# Patient Record
Sex: MALE | Race: OTHER | URBAN - METROPOLITAN AREA
[De-identification: names, ages, dates, MRNs, and addresses within clinical notes are randomized per-mention and may not be internally consistent; named-entity substitution may affect disease eponyms.]

---

## 2017-08-01 ENCOUNTER — APPOINTMENT (OUTPATIENT)
Dept: GENERAL RADIOLOGY | Facility: HOSPITAL | Age: 27
End: 2017-08-01
Attending: PHYSICIAN ASSISTANT

## 2017-08-01 ENCOUNTER — HOSPITAL ENCOUNTER (EMERGENCY)
Facility: HOSPITAL | Age: 27
Discharge: HOME | End: 2017-08-01
Attending: PHYSICIAN ASSISTANT

## 2017-08-01 VITALS
BODY MASS INDEX: 27.78 KG/M2 | HEART RATE: 88 BPM | RESPIRATION RATE: 18 BRPM | TEMPERATURE: 99 F | DIASTOLIC BLOOD PRESSURE: 83 MMHG | SYSTOLIC BLOOD PRESSURE: 127 MMHG | WEIGHT: 177 LBS | OXYGEN SATURATION: 97 % | HEIGHT: 67 IN

## 2017-08-01 DIAGNOSIS — S46.911A RIGHT SHOULDER STRAIN, INITIAL ENCOUNTER: Primary | ICD-10-CM

## 2017-08-01 DIAGNOSIS — R03.0 ELEVATED BLOOD PRESSURE READING: ICD-10-CM

## 2017-08-01 PROCEDURE — 99283 EMERGENCY DEPT VISIT LOW MDM: CPT

## 2017-08-01 PROCEDURE — 73030 X-RAY EXAM OF SHOULDER: CPT | Performed by: PHYSICIAN ASSISTANT

## 2017-08-01 RX ORDER — HYDROCODONE BITARTRATE AND ACETAMINOPHEN 5; 325 MG/1; MG/1
1 TABLET ORAL EVERY 6 HOURS PRN
Qty: 12 TABLET | Refills: 0 | Status: SHIPPED | OUTPATIENT
Start: 2017-08-01 | End: 2017-08-08

## 2017-08-01 RX ORDER — IBUPROFEN 600 MG/1
TABLET ORAL
Qty: 20 TABLET | Refills: 0 | Status: SHIPPED | OUTPATIENT
Start: 2017-08-01

## 2017-08-02 NOTE — ED NOTES
Patient was running earlier today, and fell forward, caught his fall with his right hand, and \"felt a pop. \" Good distal pulses and cap refill.

## 2017-08-02 NOTE — ED PROVIDER NOTES
Patient Seen in: [facility] Emergency Department    History   Patient presents with:  Upper Extremity Injury (musculoskeletal)    Stated Complaint: r shoulder injury    HPI    HPI: Yelitza Rodriguez is a 27year old male who presents with chief comp Pulse 88   Temp 98.6 °F (37 °C) (Temporal)   Resp 18   Ht 170.2 cm (5' 7\")   Wt 80.3 kg   SpO2 97%   BMI 27.72 kg/m²         Physical Exam      Constitutional: The patient is cooperative. Appears well-developed and well-nourished. No acute distress.   Psy Radiology findings: Xr Shoulder, Complete (min 2 Views), Right (cpt=73030)    Result Date: 8/1/2017  CONCLUSION: No acute osseous abnormality of the right shoulder. Patient refused offered pain medication.   Physical exam remained stable over seri

## (undated) NOTE — ED AVS SNAPSHOT
Blas Prado   MRN: [id number]    Department:  [facility] Emergency Department   Date of Visit:  8/1/2017           Disclosure     Insurance plans vary and the physician(s) referred by the ER may not be covered by your plan.  Please contact y CARE PHYSICIAN AT ONCE OR RETURN IMMEDIATELY TO THE EMERGENCY DEPARTMENT. If you have been prescribed any medication(s), please fill your prescription right away and begin taking the medication(s) as directed.   If you believe that any of the medications